# Patient Record
Sex: MALE | Race: WHITE | NOT HISPANIC OR LATINO | Employment: FULL TIME | URBAN - METROPOLITAN AREA
[De-identification: names, ages, dates, MRNs, and addresses within clinical notes are randomized per-mention and may not be internally consistent; named-entity substitution may affect disease eponyms.]

---

## 2017-10-23 ENCOUNTER — APPOINTMENT (OUTPATIENT)
Dept: SOCIAL WORK | Facility: CLINIC | Age: 47
End: 2017-10-23
Payer: COMMERCIAL

## 2017-10-23 PROCEDURE — 90686 IIV4 VACC NO PRSV 0.5 ML IM: CPT | Performed by: REGISTERED NURSE

## 2017-10-23 PROCEDURE — 90471 IMMUNIZATION ADMIN: CPT | Performed by: REGISTERED NURSE

## 2018-03-08 ENCOUNTER — HOSPITAL ENCOUNTER (OUTPATIENT)
Dept: LAB | Facility: MEDICAL CENTER | Age: 48
End: 2018-03-08
Attending: FAMILY MEDICINE
Payer: COMMERCIAL

## 2018-03-08 LAB — TSH SERPL DL<=0.005 MIU/L-ACNC: 1.59 UIU/ML (ref 0.38–5.33)

## 2018-03-08 PROCEDURE — 84443 ASSAY THYROID STIM HORMONE: CPT

## 2018-03-08 PROCEDURE — 84403 ASSAY OF TOTAL TESTOSTERONE: CPT

## 2018-03-08 PROCEDURE — 84270 ASSAY OF SEX HORMONE GLOBUL: CPT

## 2018-03-08 PROCEDURE — 36415 COLL VENOUS BLD VENIPUNCTURE: CPT

## 2018-03-08 PROCEDURE — 84402 ASSAY OF FREE TESTOSTERONE: CPT

## 2018-03-10 LAB
SHBG SERPL-SCNC: 45 NMOL/L (ref 11–80)
TESTOST FREE MFR SERPL: 1.5 % (ref 1.6–2.9)
TESTOST FREE SERPL-MCNC: 32 PG/ML (ref 47–244)
TESTOST SERPL-MCNC: 216 NG/DL (ref 300–890)

## 2018-04-19 ENCOUNTER — HOSPITAL ENCOUNTER (OUTPATIENT)
Dept: LAB | Facility: MEDICAL CENTER | Age: 48
End: 2018-04-19
Attending: FAMILY MEDICINE
Payer: COMMERCIAL

## 2018-04-19 LAB — TSH SERPL DL<=0.005 MIU/L-ACNC: 1.55 UIU/ML (ref 0.38–5.33)

## 2018-04-19 PROCEDURE — 84443 ASSAY THYROID STIM HORMONE: CPT

## 2018-04-19 PROCEDURE — 84403 ASSAY OF TOTAL TESTOSTERONE: CPT

## 2018-04-19 PROCEDURE — 84270 ASSAY OF SEX HORMONE GLOBUL: CPT

## 2018-04-19 PROCEDURE — 36415 COLL VENOUS BLD VENIPUNCTURE: CPT

## 2018-04-21 LAB
SHBG SERPL-SCNC: 42 NMOL/L (ref 11–80)
TESTOST FREE MFR SERPL: 1.5 % (ref 1.6–2.9)
TESTOST FREE SERPL-MCNC: 29 PG/ML (ref 47–244)
TESTOST SERPL-MCNC: 194 NG/DL (ref 300–890)

## 2019-02-25 ENCOUNTER — OFFICE VISIT (OUTPATIENT)
Dept: NEUROLOGY | Facility: MEDICAL CENTER | Age: 49
End: 2019-02-25
Payer: COMMERCIAL

## 2019-02-25 VITALS
OXYGEN SATURATION: 96 % | WEIGHT: 208.6 LBS | HEART RATE: 68 BPM | TEMPERATURE: 98 F | HEIGHT: 73 IN | DIASTOLIC BLOOD PRESSURE: 82 MMHG | SYSTOLIC BLOOD PRESSURE: 138 MMHG | BODY MASS INDEX: 27.65 KG/M2 | RESPIRATION RATE: 17 BRPM

## 2019-02-25 PROCEDURE — 99213 OFFICE O/P EST LOW 20 MIN: CPT | Performed by: NURSE PRACTITIONER

## 2019-02-25 ASSESSMENT — ENCOUNTER SYMPTOMS
DEPRESSION: 0
VOMITING: 0
NAUSEA: 0
DOUBLE VISION: 0
DIARRHEA: 0
HEADACHES: 1
COUGH: 0
SEIZURES: 0
NERVOUS/ANXIOUS: 0
MUSCULOSKELETAL NEGATIVE: 1
ABDOMINAL PAIN: 0
SORE THROAT: 0
CONSTITUTIONAL NEGATIVE: 1

## 2019-02-25 ASSESSMENT — PATIENT HEALTH QUESTIONNAIRE - PHQ9: CLINICAL INTERPRETATION OF PHQ2 SCORE: 0

## 2019-02-25 ASSESSMENT — PAIN SCALES - GENERAL: PAINLEVEL: 2=MINIMAL-SLIGHT

## 2019-02-25 NOTE — PROGRESS NOTES
Subjective:      Anish Walker is a 49 y.o. male who presents with Follow-Up (chronic migraine)          HPI  Presents today for chronic migraines.  His son has been very successful with using a CGRP modulator which has greatly improved his migraines.  He would like to begin the same treatment.    Headaches are consistent and back to their worst since October/November.  He had some reprieve with Botox which was stopped in 2016.    Migraine frequency: headaches occurring 3-4 times per week. Onset is variable.    Abortive treatment: Advil followed by Imitrex then lastly a Treximet if needed.    He is still working shift work as a .    Current Outpatient Prescriptions   Medication Sig Dispense Refill   • TREXIMET  MG Tab TAKE 0.5 TO 1 TABLET BY MOUTH AT ONSET OF HEADACHE. MAY REPEAT DOSE IN 2 HOURS IF UNRELIEVED. 9 Tab 2   • sumatriptan (IMITREX) 100 MG tablet Take 1/2-1 tablet po at onset of headache, may repeat dose in 2 hours if unrelieved.  Do not exceed more than 2 tablets in 24 hours. 9 Tab 5   • atorvastatin (LIPITOR) 20 MG TABS Take 20 mg by mouth every evening.     • Sumatriptan-Naproxen Sodium (TREXIMET PO) Take  by mouth.     • FLUoxetine HCl (PROZAC PO) Take  by mouth.       No current facility-administered medications for this visit.          Review of Systems   Constitutional: Negative.    HENT: Negative for hearing loss, nosebleeds and sore throat.         No recent head injury.   Eyes: Negative for double vision.        No new loss of vision.   Respiratory: Negative for cough.         No recent lung infections.   Cardiovascular: Negative for chest pain.   Gastrointestinal: Negative for abdominal pain, diarrhea, nausea and vomiting.   Genitourinary: Negative.    Musculoskeletal: Negative.    Skin: Negative.    Neurological: Positive for headaches. Negative for seizures.   Endo/Heme/Allergies:        No history of endocrine dysfunction.  No new problems.   Psychiatric/Behavioral:  "Negative for depression. The patient is not nervous/anxious.         No recent mood changes.          Objective:     /82 (BP Location: Left arm, Patient Position: Sitting)   Pulse 68   Temp 36.7 °C (98 °F)   Resp 17   Ht 1.854 m (6' 1\")   Wt 94.6 kg (208 lb 9.6 oz)   SpO2 96%   BMI 27.52 kg/m²      Physical Exam   Constitutional: He is oriented to person, place, and time. He appears well-developed and well-nourished. No distress.   HENT:   Head: Normocephalic and atraumatic.   Nose: Nose normal.   No new hearing loss.   Eyes: EOM are normal.   No double vision or loss of vision.   Neck: Normal range of motion.   Cardiovascular: Normal rate and regular rhythm.    No recent chest pain.   Pulmonary/Chest: Effort normal.   Abdominal: There is no tenderness.   Genitourinary:   Genitourinary Comments: No recent infections.   Musculoskeletal: Normal range of motion.   Lymphadenopathy:     He has no cervical adenopathy.   Neurological: He is alert and oriented to person, place, and time. He has normal strength and normal reflexes. He displays no tremor. No cranial nerve deficit. He displays no seizure activity. Gait normal.   No observable changes in neurologic status.  See initial new patient examination for details.     Skin: Skin is warm and dry.   Psychiatric: He has a normal mood and affect. His speech is normal. His mood appears not anxious. He does not exhibit a depressed mood.             Assessment/Plan:     Chronic migraine:  Significant improvement with Botox injection sets.  Migraines have returned and are occurring approximately 3-4 times weekly.     Counseled regarding migraine prophylaxis options including the CGRP modulator.  Will new start Ajovy monthly injections.  Discussed usage, side effect profile, and benefits of the CGRP modulator.    He will continue to use imitrex tablets and Treximet tablets for abortive treatment per PCP.    Return for follow-up in 6 months for evaluation of " effectiveness of Ajovy.  To call with any concerns or lack of efficacy.

## 2019-03-05 ENCOUNTER — OFFICE VISIT (OUTPATIENT)
Dept: URGENT CARE | Facility: CLINIC | Age: 49
End: 2019-03-05
Payer: COMMERCIAL

## 2019-03-05 VITALS
HEIGHT: 73 IN | WEIGHT: 205 LBS | HEART RATE: 85 BPM | BODY MASS INDEX: 27.17 KG/M2 | OXYGEN SATURATION: 94 % | SYSTOLIC BLOOD PRESSURE: 140 MMHG | RESPIRATION RATE: 14 BRPM | TEMPERATURE: 98.1 F | DIASTOLIC BLOOD PRESSURE: 90 MMHG
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
## 2019-03-05 PROCEDURE — 99203 OFFICE O/P NEW LOW 30 MIN: CPT | Performed by: NURSE PRACTITIONER

## 2019-03-05 RX ORDER — FLUOXETINE HYDROCHLORIDE 60 MG/1
1 TABLET, FILM COATED ORAL; ORAL EVERY EVENING
Refills: 6 | COMMUNITY
Start: 2018-12-21

## 2019-03-05 RX ORDER — CEPHALEXIN 500 MG/1
500 CAPSULE ORAL 3 TIMES DAILY
Qty: 30 CAP | Refills: 0 | Status: SHIPPED | OUTPATIENT
Start: 2019-03-05 | End: 2019-03-15

## 2019-03-05 ASSESSMENT — ENCOUNTER SYMPTOMS
PAIN: 1
CHILLS: 0
NUMBNESS: 0
DIZZINESS: 0
EYE PAIN: 0
SORE THROAT: 0
FEVER: 0
MYALGIAS: 0
ABDOMINAL PAIN: 0
VOMITING: 0
DIAPHORESIS: 0
WEAKNESS: 0
JOINT SWELLING: 1
FATIGUE: 0
NAUSEA: 0
HEADACHES: 0
SHORTNESS OF BREATH: 0

## 2019-03-06 NOTE — PROGRESS NOTES
Subjective:     Anish Walker is a 49 y.o. male who presents for Insect Bite (x3days, left wrist insect bite, swollen, painful, initaially itchiness,  arm tingling)  Patient is a 49-year-old male presents clinic today for evaluation of an infected insect bite that occurred 3 days ago.  Left wrist has become painful, swollen, redness with itching.  He has attempted to take a Benadryl however made him sleepy and had no effect.  Denies any fever, chills.  He does have some tingling in the left arm.  Patient verbalizing multiple others scabbed lesions on the left arm are unrelated from a trip to [place].  Verbalizing that those lesions are vastly improved.     Pain   This is a new problem. Episode onset: 3 days. The problem occurs constantly. The problem has been gradually worsening. Associated symptoms include joint swelling (left wrist infected insect bite, red swollen). Pertinent negatives include no abdominal pain, chest pain, chills, diaphoresis, fatigue, fever, headaches, myalgias, nausea, numbness, rash, sore throat, vomiting or weakness. Nothing aggravates the symptoms. Treatments tried: benadryl. The treatment provided no relief.     Past Medical History:   Diagnosis Date   • Head ache    • Migraine    History reviewed. No pertinent surgical history.  Social History     Social History   • Marital status:      Spouse name: N/A   • Number of children: N/A   • Years of education: N/A     Occupational History   • Not on file.     Social History Main Topics   • Smoking status: Never Smoker   • Smokeless tobacco: Never Used   • Alcohol use Yes      Comment: a few during week   • Drug use: No   • Sexual activity: Not on file     Other Topics Concern   • Not on file     Social History Narrative   • No narrative on file    History reviewed. No pertinent family history. Review of Systems   Constitutional: Negative for chills, diaphoresis, fatigue and fever.   HENT: Negative for sore throat.    Eyes:  "Negative for pain.   Respiratory: Negative for shortness of breath.    Cardiovascular: Negative for chest pain.   Gastrointestinal: Negative for abdominal pain, nausea and vomiting.   Genitourinary: Negative for hematuria.   Musculoskeletal: Positive for joint swelling (left wrist infected insect bite, red swollen). Negative for myalgias.   Skin: Negative for rash.        Insect bite of the left wrist red, painful, swollen   Neurological: Negative for dizziness, weakness, numbness and headaches.     Allergies   Allergen Reactions   • Biaxin [Clarithromycin]       Objective:   /90 (BP Location: Right arm, Patient Position: Sitting, BP Cuff Size: Adult)   Pulse 85   Temp 36.7 °C (98.1 °F) (Temporal)   Resp 14   Ht 1.854 m (6' 1\")   Wt 93 kg (205 lb)   SpO2 94%   BMI 27.05 kg/m²   Physical Exam   Constitutional: He is oriented to person, place, and time. He appears well-developed and well-nourished. No distress.   HENT:   Head: Normocephalic and atraumatic.   Eyes: Pupils are equal, round, and reactive to light. Conjunctivae and EOM are normal.   Cardiovascular: Normal rate and regular rhythm.    No murmur heard.  Pulmonary/Chest: Effort normal and breath sounds normal. No respiratory distress.   Abdominal: Soft. He exhibits no distension. There is no tenderness.   Neurological: He is alert and oriented to person, place, and time. He has normal reflexes. No sensory deficit.   Skin: Skin is warm and dry. Lesion and rash noted. There is erythema.        Psychiatric: He has a normal mood and affect.         Assessment/Plan:   Assessment    1. Bug bite with infection, initial encounter  cephALEXin (KEFLEX) 500 MG Cap     Encourage patient to take daily antihistamine in combination to prescribed antibiotics.  Advised if symptoms not improving and/or worsen he needs to return to clinic immediately for reevaluation and antibiotic change.  Patient given precautionary s/sx that mandate immediate follow up and " evaluation in the ED. Advised of risks of not doing so.    DDX, Supportive care, and indications for immediate follow-up discussed with patient.    Instructed to return to clinic or nearest emergency department if we are not available for any change in condition, further concerns, or worsening of symptoms.    The patient demonstrated a good understanding and agreed with the treatment plan.

## 2019-03-24 ENCOUNTER — HOSPITAL ENCOUNTER (EMERGENCY)
Facility: MEDICAL CENTER | Age: 49
End: 2019-03-24
Attending: EMERGENCY MEDICINE
Payer: COMMERCIAL

## 2019-03-24 ENCOUNTER — APPOINTMENT (OUTPATIENT)
Dept: RADIOLOGY | Facility: MEDICAL CENTER | Age: 49
End: 2019-03-24
Attending: EMERGENCY MEDICINE
Payer: COMMERCIAL

## 2019-03-24 VITALS
HEIGHT: 73 IN | BODY MASS INDEX: 27.17 KG/M2 | WEIGHT: 205.03 LBS | HEART RATE: 64 BPM | SYSTOLIC BLOOD PRESSURE: 146 MMHG | DIASTOLIC BLOOD PRESSURE: 94 MMHG | OXYGEN SATURATION: 94 % | TEMPERATURE: 98.7 F | RESPIRATION RATE: 18 BRPM

## 2019-03-24 DIAGNOSIS — L03.211 FACIAL CELLULITIS: ICD-10-CM

## 2019-03-24 LAB
ALBUMIN SERPL BCP-MCNC: 4.3 G/DL (ref 3.2–4.9)
ALBUMIN/GLOB SERPL: 1.7 G/DL
ALP SERPL-CCNC: 71 U/L (ref 30–99)
ALT SERPL-CCNC: 25 U/L (ref 2–50)
ANION GAP SERPL CALC-SCNC: 9 MMOL/L (ref 0–11.9)
AST SERPL-CCNC: 29 U/L (ref 12–45)
BASOPHILS # BLD AUTO: 0.4 % (ref 0–1.8)
BASOPHILS # BLD: 0.05 K/UL (ref 0–0.12)
BILIRUB SERPL-MCNC: 1.4 MG/DL (ref 0.1–1.5)
BUN SERPL-MCNC: 18 MG/DL (ref 8–22)
CALCIUM SERPL-MCNC: 9.4 MG/DL (ref 8.4–10.2)
CHLORIDE SERPL-SCNC: 105 MMOL/L (ref 96–112)
CO2 SERPL-SCNC: 24 MMOL/L (ref 20–33)
CREAT SERPL-MCNC: 0.81 MG/DL (ref 0.5–1.4)
EOSINOPHIL # BLD AUTO: 0.11 K/UL (ref 0–0.51)
EOSINOPHIL NFR BLD: 0.9 % (ref 0–6.9)
ERYTHROCYTE [DISTWIDTH] IN BLOOD BY AUTOMATED COUNT: 38.1 FL (ref 35.9–50)
GLOBULIN SER CALC-MCNC: 2.5 G/DL (ref 1.9–3.5)
GLUCOSE SERPL-MCNC: 106 MG/DL (ref 65–99)
HCT VFR BLD AUTO: 42.3 % (ref 42–52)
HGB BLD-MCNC: 14.5 G/DL (ref 14–18)
IMM GRANULOCYTES # BLD AUTO: 0.04 K/UL (ref 0–0.11)
IMM GRANULOCYTES NFR BLD AUTO: 0.3 % (ref 0–0.9)
LYMPHOCYTES # BLD AUTO: 0.81 K/UL (ref 1–4.8)
LYMPHOCYTES NFR BLD: 6.6 % (ref 22–41)
MCH RBC QN AUTO: 30.5 PG (ref 27–33)
MCHC RBC AUTO-ENTMCNC: 34.3 G/DL (ref 33.7–35.3)
MCV RBC AUTO: 88.9 FL (ref 81.4–97.8)
MONOCYTES # BLD AUTO: 0.87 K/UL (ref 0–0.85)
MONOCYTES NFR BLD AUTO: 7.1 % (ref 0–13.4)
NEUTROPHILS # BLD AUTO: 10.37 K/UL (ref 1.82–7.42)
NEUTROPHILS NFR BLD: 84.7 % (ref 44–72)
NRBC # BLD AUTO: 0 K/UL
NRBC BLD-RTO: 0 /100 WBC
PLATELET # BLD AUTO: 217 K/UL (ref 164–446)
PMV BLD AUTO: 9.6 FL (ref 9–12.9)
POTASSIUM SERPL-SCNC: 4 MMOL/L (ref 3.6–5.5)
PROT SERPL-MCNC: 6.8 G/DL (ref 6–8.2)
RBC # BLD AUTO: 4.76 M/UL (ref 4.7–6.1)
SODIUM SERPL-SCNC: 138 MMOL/L (ref 135–145)
WBC # BLD AUTO: 12.3 K/UL (ref 4.8–10.8)

## 2019-03-24 PROCEDURE — 96365 THER/PROPH/DIAG IV INF INIT: CPT

## 2019-03-24 PROCEDURE — 87040 BLOOD CULTURE FOR BACTERIA: CPT

## 2019-03-24 PROCEDURE — 80053 COMPREHEN METABOLIC PANEL: CPT

## 2019-03-24 PROCEDURE — 85025 COMPLETE CBC W/AUTO DIFF WBC: CPT

## 2019-03-24 PROCEDURE — 700111 HCHG RX REV CODE 636 W/ 250 OVERRIDE (IP): Performed by: EMERGENCY MEDICINE

## 2019-03-24 PROCEDURE — 700105 HCHG RX REV CODE 258: Performed by: EMERGENCY MEDICINE

## 2019-03-24 PROCEDURE — 70491 CT SOFT TISSUE NECK W/DYE: CPT

## 2019-03-24 PROCEDURE — 99284 EMERGENCY DEPT VISIT MOD MDM: CPT

## 2019-03-24 PROCEDURE — 700117 HCHG RX CONTRAST REV CODE 255: Performed by: EMERGENCY MEDICINE

## 2019-03-24 RX ORDER — CEPHALEXIN 500 MG/1
500 CAPSULE ORAL 3 TIMES DAILY
Status: SHIPPED | COMMUNITY
Start: 2019-03-05 | End: 2019-09-30

## 2019-03-24 RX ORDER — IBUPROFEN 200 MG
800 TABLET ORAL EVERY 6 HOURS PRN
Status: SHIPPED | COMMUNITY
End: 2023-11-09

## 2019-03-24 RX ORDER — AMOXICILLIN AND CLAVULANATE POTASSIUM 875; 125 MG/1; MG/1
1 TABLET, FILM COATED ORAL 2 TIMES DAILY
Qty: 14 TAB | Refills: 0 | Status: SHIPPED | OUTPATIENT
Start: 2019-03-24 | End: 2019-03-31

## 2019-03-24 RX ORDER — ONDANSETRON 4 MG/1
4 TABLET, ORALLY DISINTEGRATING ORAL EVERY 8 HOURS PRN
Qty: 10 TAB | Refills: 0 | Status: SHIPPED | OUTPATIENT
Start: 2019-03-24 | End: 2023-11-09

## 2019-03-24 RX ORDER — SULFAMETHOXAZOLE AND TRIMETHOPRIM 800; 160 MG/1; MG/1
1 TABLET ORAL 2 TIMES DAILY
Qty: 14 TAB | Refills: 0 | Status: SHIPPED | OUTPATIENT
Start: 2019-03-24 | End: 2019-03-31

## 2019-03-24 RX ADMIN — SODIUM CHLORIDE 3 G: 900 INJECTION INTRAVENOUS at 09:14

## 2019-03-24 RX ADMIN — IOHEXOL 80 ML: 350 INJECTION, SOLUTION INTRAVENOUS at 09:56

## 2019-03-24 NOTE — ED NOTES
Med rec updated and complete  Allergies reviewed  Interviewed pt with wife at bedside with permission from pt.

## 2019-03-24 NOTE — ED TRIAGE NOTES
"C/O left lower jaw abscess since yesterday after a routine dental cleaning.  A small lesion is noted on the left face where the swelling is located. He admit \"picking at it.\"   Chief Complaint   Patient presents with   • Abscess   • Facial Swelling       /94   Pulse 70   Temp 36.8 °C (98.3 °F) (Temporal)   Resp 18   Ht 1.854 m (6' 1\")   Wt 93 kg (205 lb 0.4 oz)   SpO2 96%   BMI 27.05 kg/m²     "

## 2019-03-24 NOTE — ED PROVIDER NOTES
"ED Provider Note    CHIEF COMPLAINT  Chief Complaint   Patient presents with   • Abscess   • Facial Swelling       [facility]  Anish Walker is a 49 y.o. male who presents for evaluation of facial swelling.  3 days ago the patient had dental cleaning performed.  He started noticing swelling to his left jaw region yesterday which is gotten progressively worse.  He has had no fevers.  He has no difficulty breathing or swallowing.  5 days ago he completed a course of Keflex for treatment of an abscess to his left wrist which had been drained.  That seems to be doing fine at this time.  He denies any dental pain.  The patient's wife is an [employer] that works with Dr. Abreu.  Dr. Abreu gave me a call regarding this patient prior to his arrival.    REVIEW OF SYSTEMS  See HPI for further details. All other systems negative.    PAST MEDICAL HISTORY  Past Medical History:   Diagnosis Date   • Head ache    • Migraine        FAMILY HISTORY  History reviewed. No pertinent family history.    SOCIAL HISTORY  Social History     Social History   • Marital status:      Spouse name: N/A   • Number of children: N/A   • Years of education: N/A     Social History Main Topics   • Smoking status: Never Smoker   • Smokeless tobacco: Never Used   • Alcohol use Yes      Comment: Occasionally   • Drug use: No   • Sexual activity: Not on file     Other Topics Concern   • Not on file     Social History Narrative   • No narrative on file       SURGICAL HISTORY  History reviewed. No pertinent surgical history.    CURRENT MEDICATIONS  Home Medications    **Home medications have not yet been reviewed for this encounter**         ALLERGIES  Allergies   Allergen Reactions   • Biaxin [Clarithromycin]        PHYSICAL EXAM  VITAL SIGNS: /94   Pulse 70   Temp 36.8 °C (98.3 °F) (Temporal)   Resp 18   Ht 1.854 m (6' 1\")   Wt 93 kg (205 lb 0.4 oz)   SpO2 96%   BMI 27.05 kg/m²   Constitutional: Well developed, Well nourished, No acute distress, " Non-toxic appearance.   HENT: Normocephalic, slight left facial swelling.  No trismus.  Oral exam shows no focal gum swelling.  Floor of the mouth appears soft.  Small open wound overlying the swollen area where the patient states he has been picking at it.  Eyes:  EOMI, Conjunctiva normal, No discharge.   Cardiovascular: Normal heart rate, Normal rhythm, No murmurs, No rubs, No gallops.   Thorax & Lungs: Clear to auscultation without wheezes, rales, or rhonchi.    Skin: Warm, Dry.  Musculoskeletal: Good range of motion in all major joints.  Neurologic: Awake and alert.  No focal deficits noted.    RADIOLOGY/PROCEDURES  CT-SOFT TISSUE NECK WITH   Final Result      Inflammatory changes along the left mandible extending into the submandibular region. No abscess collection is identified.      Mildly prominent submandibular and submental lymph nodes are likely reactive.      Mild mucosal thickening in the right maxillary sinus.      Rightward deviation of the nasal septum.               COURSE & MEDICAL DECISION MAKING  Pertinent Labs & Imaging studies reviewed. (See chart for details)  This is a 49-year-old here for evaluation of left facial swelling.  Patient appears to have an infection and possibly abscess therefore an IV is established.  Laboratories are obtained to include a CBC with a white count of 12 and a differential of 84 polys and 6 lymphocytes.  Chemistries are all essentially normal.  Blood cultures are obtained.  Patient is treated with Unasyn 3 g IV.  CT scan of the soft tissues of the neck demonstrate inflammatory changes but no evidence of abscess.  He does have some reactive lymph nodes noted.  I discussed the results of the study with the patient and his wife.  At this point this appears to represent a facial cellulitis.  I explained that there is no abscess requiring drainage.  I will start him on Augmentin and Bactrim.  He is also provided a prescription for Zofran.  He is to be reevaluated by his  primary care provider.  His wife is also an [employer] and she will be able to keep tabs on it.  He should return here for any worsening symptoms.  He is given a discharge instruction sheet on facial infections.    FINAL IMPRESSION  1.  Facial cellulitis  2.   3.         Electronically signed by: Otto Portillo, 3/24/2019 8:51 AM

## 2019-03-24 NOTE — ED NOTES
Patient and wife understand discharge instructions. Will start antibiotics today. Will return as needed.

## 2019-03-24 NOTE — ED NOTES
Had teeth cleaned on Thursday. And swelling has been bad for a day. Finished antibiotics last week for wound on his left wrist. Took keflex

## 2019-03-27 ENCOUNTER — HOSPITAL ENCOUNTER (OUTPATIENT)
Dept: LAB | Facility: MEDICAL CENTER | Age: 49
End: 2019-03-27
Attending: OTOLARYNGOLOGY
Payer: COMMERCIAL

## 2019-03-27 PROCEDURE — 87077 CULTURE AEROBIC IDENTIFY: CPT

## 2019-03-27 PROCEDURE — 87205 SMEAR GRAM STAIN: CPT

## 2019-03-27 PROCEDURE — 87186 SC STD MICRODIL/AGAR DIL: CPT

## 2019-03-27 PROCEDURE — 87070 CULTURE OTHR SPECIMN AEROBIC: CPT

## 2019-03-28 LAB
GRAM STN SPEC: NORMAL
SIGNIFICANT IND 70042: NORMAL
SITE SITE: NORMAL
SOURCE SOURCE: NORMAL

## 2019-03-29 LAB
BACTERIA BLD CULT: NORMAL
BACTERIA BLD CULT: NORMAL
SIGNIFICANT IND 70042: NORMAL
SIGNIFICANT IND 70042: NORMAL
SITE SITE: NORMAL
SITE SITE: NORMAL
SOURCE SOURCE: NORMAL
SOURCE SOURCE: NORMAL

## 2019-03-30 LAB
BACTERIA WND AEROBE CULT: ABNORMAL
BACTERIA WND AEROBE CULT: ABNORMAL
GRAM STN SPEC: ABNORMAL
SIGNIFICANT IND 70042: ABNORMAL
SITE SITE: ABNORMAL
SOURCE SOURCE: ABNORMAL

## 2019-09-30 ENCOUNTER — OFFICE VISIT (OUTPATIENT)
Dept: NEUROLOGY | Facility: MEDICAL CENTER | Age: 49
End: 2019-09-30
Payer: COMMERCIAL

## 2019-09-30 ENCOUNTER — TELEPHONE (OUTPATIENT)
Dept: NEUROLOGY | Facility: MEDICAL CENTER | Age: 49
End: 2019-09-30

## 2019-09-30 VITALS
HEIGHT: 73 IN | HEART RATE: 72 BPM | RESPIRATION RATE: 16 BRPM | SYSTOLIC BLOOD PRESSURE: 118 MMHG | OXYGEN SATURATION: 95 % | WEIGHT: 200 LBS | DIASTOLIC BLOOD PRESSURE: 80 MMHG | TEMPERATURE: 97 F | BODY MASS INDEX: 26.51 KG/M2

## 2019-09-30 PROCEDURE — 99213 OFFICE O/P EST LOW 20 MIN: CPT | Performed by: NURSE PRACTITIONER

## 2019-09-30 RX ORDER — SUMATRIPTAN AND NAPROXEN SODIUM 85; 500 MG/1; MG/1
TABLET, FILM COATED ORAL
Refills: 6 | COMMUNITY
Start: 2019-09-20 | End: 2019-09-30 | Stop reason: SDUPTHER

## 2019-09-30 RX ORDER — SUMATRIPTAN AND NAPROXEN SODIUM 85; 500 MG/1; MG/1
TABLET, FILM COATED ORAL
Qty: 9 TAB | Refills: 5 | Status: SHIPPED | OUTPATIENT
Start: 2019-09-30

## 2019-09-30 NOTE — PROGRESS NOTES
Subjective:      Anish Walker is a 49 y.o. male who presents with Follow-Up (Chronic migraines)            HPI Doing well with using the Ajovy for migraine treatment.  Began Ajovy in October 2018.      Migraine frequency: headaches occurring 3-4 times per week with onset variable prior to the start of Ajovy.  Now rarely with a headache and if he does have one, the triggers are quite apparent.     If he is dehyrated or sleep deprived he will have a dull ache.      Treximet is effective for abortive treatment when he has a migraine.    Cleared with his annual work physical beginning of 2019.    Current Outpatient Medications   Medication Sig Dispense Refill   • AJOVY 225 MG/1.5ML Solution Prefilled Syringe INJECT ONCE MONTHLY 1.5 mL 5   • Ascorbic Acid (VITAMIN C PO) Take 1 Tab by mouth every evening.     • VITAMIN E PO Take 1 Cap by mouth every evening.     • aspirin EC (ECOTRIN) 81 MG Tablet Delayed Response Take 81 mg by mouth every evening.     • ibuprofen (MOTRIN) 200 MG Tab Take 800 mg by mouth every 6 hours as needed for Mild Pain.     • ondansetron (ZOFRAN ODT) 4 MG TABLET DISPERSIBLE Take 1 Tab by mouth every 8 hours as needed. 10 Tab 0   • FLUoxetine HCl 60 MG Tab Take 1 Tab by mouth every evening.  6   • atorvastatin (LIPITOR) 20 MG TABS Take 20 mg by mouth every evening.     • SUMAtriptan-Naproxen Sodium  MG Tab Take 1/2-1 tablet po at onset of headache, may repeat dose in 2 hours if unrelieved.  Do not exceed more than 2 tablets in 24 hours. 9 Tab 5     No current facility-administered medications for this visit.          Review of Systems   Constitutional: Negative.    HENT: Negative for hearing loss, nosebleeds and sore throat.         No recent head injury.   Eyes: Negative for double vision.        No new loss of vision.   Respiratory: Negative for cough.         No recent lung infections.   Cardiovascular: Negative for chest pain.   Gastrointestinal: Negative for abdominal pain, diarrhea,  nausea and vomiting.   Genitourinary: Negative.    Musculoskeletal: Negative.    Skin: Negative.    Neurological: Positive for headaches. Negative for seizures.   Endo/Heme/Allergies:        No history of endocrine dysfunction.  No new problems.   Psychiatric/Behavioral: Negative for depression. The patient is not nervous/anxious.         No recent mood changes.          Objective:     Wt 90.7 kg (200 lb)   BMI 26.39 kg/m²      Physical Exam   Constitutional: He is oriented to person, place, and time. He appears well-developed and well-nourished. No distress.   HENT:   Head: Normocephalic and atraumatic.   Nose: Nose normal.   No new hearing loss.   Eyes: EOM are normal.   No double vision or loss of vision.   Neck: Normal range of motion.   Cardiovascular: Normal rate and regular rhythm.   No recent chest pain.   Pulmonary/Chest: Effort normal.   Abdominal: There is no tenderness.   Genitourinary:   Genitourinary Comments: No recent infections.   Musculoskeletal: Normal range of motion.   Lymphadenopathy:     He has no cervical adenopathy.   Neurological: He is alert and oriented to person, place, and time. He has normal strength and normal reflexes. He displays no tremor. No cranial nerve deficit. He displays no seizure activity. Gait normal.   No observable changes in neurologic status.  See initial new patient examination for details.     Skin: Skin is warm and dry.   Psychiatric: He has a normal mood and affect. His speech is normal. His mood appears not anxious. He does not exhibit a depressed mood.             Assessment/Plan:   Chronic migraine:  Significant improvement with Botox injection sets.  Migraines returned and baseline approximately 3-4 times weekly.      Continue Ajovy, CGRP inhibitor, monthly injections.  Discussed usage, side effect profile, and benefits of the CGRP modulator.     He will continue to use imitrex tablets and Treximet tablets for abortive treatment per PCP.     Return for  follow-up in 6 months for evaluation of effectiveness of Ajovy.

## 2019-10-16 ASSESSMENT — ENCOUNTER SYMPTOMS
NERVOUS/ANXIOUS: 0
DOUBLE VISION: 0
VOMITING: 0
DEPRESSION: 0
ABDOMINAL PAIN: 0
HEADACHES: 1
CONSTITUTIONAL NEGATIVE: 1
NAUSEA: 0
SORE THROAT: 0
MUSCULOSKELETAL NEGATIVE: 1
DIARRHEA: 0
SEIZURES: 0
COUGH: 0

## 2019-11-08 ENCOUNTER — IMMUNIZATION (OUTPATIENT)
Dept: SOCIAL WORK | Facility: CLINIC | Age: 49
End: 2019-11-08
Payer: COMMERCIAL

## 2019-11-08 DIAGNOSIS — Z23 NEED FOR VACCINATION: ICD-10-CM

## 2019-11-08 PROCEDURE — 90471 IMMUNIZATION ADMIN: CPT | Performed by: REGISTERED NURSE

## 2019-11-08 PROCEDURE — 90686 IIV4 VACC NO PRSV 0.5 ML IM: CPT | Performed by: REGISTERED NURSE

## 2020-04-08 ENCOUNTER — TELEPHONE (OUTPATIENT)
Dept: NEUROLOGY | Facility: MEDICAL CENTER | Age: 50
End: 2020-04-08

## 2021-09-29 ENCOUNTER — HOSPITAL ENCOUNTER (OUTPATIENT)
Facility: MEDICAL CENTER | Age: 51
End: 2021-09-29
Attending: SURGERY
Payer: COMMERCIAL

## 2021-09-29 LAB
COVID ORDER STATUS COVID19: NORMAL
SARS-COV-2 RNA RESP QL NAA+PROBE: NOTDETECTED
SPECIMEN SOURCE: NORMAL

## 2021-09-29 PROCEDURE — U0005 INFEC AGEN DETEC AMPLI PROBE: HCPCS

## 2021-09-29 PROCEDURE — U0003 INFECTIOUS AGENT DETECTION BY NUCLEIC ACID (DNA OR RNA); SEVERE ACUTE RESPIRATORY SYNDROME CORONAVIRUS 2 (SARS-COV-2) (CORONAVIRUS DISEASE [COVID-19]), AMPLIFIED PROBE TECHNIQUE, MAKING USE OF HIGH THROUGHPUT TECHNOLOGIES AS DESCRIBED BY CMS-2020-01-R: HCPCS

## 2023-11-09 ENCOUNTER — APPOINTMENT (OUTPATIENT)
Dept: URGENT CARE | Facility: CLINIC | Age: 53
End: 2023-11-09

## 2023-11-09 ENCOUNTER — OCCUPATIONAL MEDICINE (OUTPATIENT)
Dept: URGENT CARE | Facility: CLINIC | Age: 53
End: 2023-11-09
Payer: COMMERCIAL

## 2023-11-09 VITALS
HEART RATE: 60 BPM | RESPIRATION RATE: 12 BRPM | HEIGHT: 73 IN | SYSTOLIC BLOOD PRESSURE: 130 MMHG | TEMPERATURE: 97.9 F | DIASTOLIC BLOOD PRESSURE: 78 MMHG | WEIGHT: 195 LBS | BODY MASS INDEX: 25.84 KG/M2 | OXYGEN SATURATION: 98 %

## 2023-11-09 DIAGNOSIS — S39.011A STRAIN OF ABDOMINAL MUSCLE, INITIAL ENCOUNTER: ICD-10-CM

## 2023-11-09 DIAGNOSIS — K45.8 OTHER SPECIFIED ABDOMINAL HERNIA WITHOUT OBSTRUCTION OR GANGRENE: ICD-10-CM

## 2023-11-09 PROCEDURE — 3075F SYST BP GE 130 - 139MM HG: CPT | Performed by: FAMILY MEDICINE

## 2023-11-09 PROCEDURE — 99203 OFFICE O/P NEW LOW 30 MIN: CPT | Performed by: FAMILY MEDICINE

## 2023-11-09 PROCEDURE — 3078F DIAST BP <80 MM HG: CPT | Performed by: FAMILY MEDICINE

## 2023-11-09 NOTE — LETTER
[facility]  [address] [address]  Phone:  [phone] - Fax:  [phone]   [facility] Progress Report and Disability Certification  Date of Service: 11/9/2023   No Show:  No  Date / Time of Next Visit: 11/21/23  3:00PM   Claim Information   Patient Name: Anish Walker  Claim Number:     Employer:   [employer] Date of Injury: 10/9/2023     Insurer / TPA: [employer]  ID / SSN:     Occupation:   Diagnosis: Diagnoses of Other specified abdominal hernia without obstruction or gangrene and Strain of abdominal muscle, initial encounter were pertinent to this visit.    Medical Information   Related to Industrial Injury? Yes    Subjective Complaints:  DOI 10/09/2023 SUMAN: lifting something heavy and turning/twisting and felt a pain to Rt groin lower abd area. Since then has had some soreness and small bulge if strains. No nvfc. Voiding normal, stools normal   Objective Findings:     Pre-Existing Condition(s):     Assessment:   Initial Visit    Status: Discharged / Care Transfer  Permanent Disability:No    Plan: ConsultationTransfer Care    Diagnostics: Other (see comment)  Comments:US    Comments:       Disability Information   Status: Released to Restricted Duty    From:  11/9/2023  Through: 11/23/2023 Restrictions are: Temporary   Physical Restrictions   Sitting:    Standing:    Stooping:    Bending:      Squatting:    Walking:    Climbing:    Pushing:      Pulling:    Other:    Reaching Above Shoulder (L):   Reaching Above Shoulder (R):       Reaching Below Shoulder (L):    Reaching Below Shoulder (R):      Not to exceed Weight Limits   Carrying(hrs):   Weight Limit(lb):   Lifting(hrs):   Weight  Limit(lb):     Comments: * Lifting as tolerated *     Repetitive Actions   Hands: i.e. Fine Manipulations from Grasping:     Feet: i.e. Operating Foot Controls:     Driving / Operate Machinery:      Health Care Provider’s Original or Electronic Signature  Alon Temple M.D. Health Care Provider’s Original or Electronic Signature    Tyler Carrington DO MPH     Clinic Name / Location: [address] [address] Clinic Phone Number: Dept: [phone]   Appointment Time: 10:30 Am Visit Start Time: 10:59 AM   Check-In Time:  10:54 Am Visit Discharge Time: 11:09 AM   Original-Treating Physician or Chiropractor    Page 2-Insurer/TPA    Page 3-Employer    Page 4-Employee

## 2023-11-09 NOTE — LETTER
"    EMPLOYEE’S CLAIM FOR COMPENSATION/ REPORT OF INITIAL TREATMENT  FORM C-4  PLEASE TYPE OR PRINT    EMPLOYEE’S CLAIM - PROVIDE ALL INFORMATION REQUESTED   First Name                    JEANNETTE Oconnell Last Name  Aaron Birthdate                    1970                Sex  []M  []F Claim Number (Insurer’s Use Only)     Home Address  [address] [place] [address] Age  53 y.o. Height  1.854 m (6' 1\") Weight  88.5 kg (195 lb) Social Security Number     [facility] Zip  [address] Telephone  [phone] (home) [phone] (work)   Mailing Address  [address] [place] [address] Zip  [address] Primary Language Spoken  English    INSURER   THIRD-PARTY   [employer]   Employee's Occupation (Job Title) When Injury or Occupational Disease Occurred      Employer's Name/Company Name     Telephone      Office Mail Address (Number and Street)       Date of Injury (if applicable) 10/9/2023               Hours Injury (if applicable)            am               pm Date Employer Notified  10/9/2023 Last Day of Work after Injury or Occupational Disease  10/10/2023 Supervisor to Whom Injury     Reported  Kaveh Solis   Address or Location of Accident (if applicable)  Work [1]   What were you doing at the time of accident? (if applicable)  unloading extrication tool    How did this injury or occupational disease occur? (Be specific and answer in detail. Use additional sheet if necessary)  pulling off extrication tool from engine.pulled off and turn and felt pain on RT side of groin   If you believe that you have an occupational disease, when did you first have knowledge of the disability and its relationship to your employment?   Witnesses to the Accident (if applicable)  NA      Nature of Injury or Occupational Disease    Part(s) of Body Injured or Affected        I CERTIFY THAT THE ABOVE IS TRUE " AND CORRECT TO T HE BEST OF MY KNOWLEDGE AND THAT I HAVE PROVIDED THIS INFORMATION IN ORDER TO OBTAIN THE BENEFITS OF [place], OR [place]).  I HEREBY AUTHORIZE ANY PHYSICIAN, CHIROPRACTOR, SURGEON, PRACTITIONER OR ANY OTHER PERSON, ANY HOSPITAL, INCLUDING [facility] OR [facility], ANY  MEDICAL SERVICE ORGANIZATION, ANY INSURANCE COMPANY, OR OTHER INSTITUTION OR ORGANIZATION TO RELEASE TO EACH OTHER, ANY MEDICAL OR OTHER INFORMATION, INCLUDING BENEFITS PAID OR PAYABLE, PERTINENT TO THIS INJURY OR DISEASE, EXCEPT INFORMATION RELATIVE TO DIAGNOSIS, TREATMENT AND/OR COUNSELING FOR AIDS, PSYCHOLOGICAL CONDITIONS, ALCOHOL OR CONTROLLED SUBSTANCES, FOR WHICH I MUST GIVE SPECIFIC AUTHORIZATION.  A PHOTOSTAT OF THIS AUTHORIZATION SHALL BE VALID AS THE ORIGINAL.     Date 11/9/23   [facility] Employee’s Original or  *Electronic Signature   THIS REPORT MUST BE COMPLETED AND MAILED WITHIN 3 WORKING DAYS OF TREATMENT   [facility]    Name of Facility  [facility]   Date 11/9/2023 Diagnosis and Description of Injury or Occupational Disease  (K45.8) Other specified abdominal hernia without obstruction or gangrene  (S39.011A) Strain of abdominal muscle, initial encounter  Diagnoses of Other specified abdominal hernia without obstruction or gangrene and Strain of abdominal muscle, initial encounter were pertinent to this visit. Is there evidence that the injured employee was under the influence of alcohol and/or another controlled substance at the time of accident?  []No  [] Yes (if yes, please explain)   Hour 10:59 AM  No   Treatment: US Abd, work restrictions (lifting as tolerated)     Have you advised the patient to remain off work five days or more?   [] Yes Indicate dates: From   To    []No If no, is the injured employee capable of: [] full duty [] modified duty                                                              No  Yes  If modified duty, specify any limitations / restrictions:  Lifting as tolerated                                                                                                                                                                                                                                                                                                                                                                                                                X-Ray Findings:      From information given by the employee, together with medical evidence, can you directly connect this injury or occupational disease as job incurred?  []Yes   [] No Yes    Is additional medical care by a physician indicated? []Yes [] No  Yes    Do you know of any previous injury or disease contributing to this condition or occupational disease? []Yes [] No (Explain if yes)                          No   Date  11/9/2023 Print Health Care Provider’s Name  Alon Cortez M.D. I certify that the employer’s copy of  this form was delivered to the employer on:   Address  [address] INSURER'S USE ONLY                       [facility] Zip  [address] Provider’s Tax ID Number  [id number]   Telephone  Dept: [phone]    Health Care Provider’s Original or Electronic Signature  e-SignALON CORTEZ M.D. Degree (MD,DO, DC,PA-C,APRN)  MD  Choose (if applicable)      ORIGINAL - TREATING HEALTHCARE PROVIDER PAGE 2 - INSURER/TPA PAGE 3 - EMPLOYER PAGE 4 - EMPLOYEE             Form C-4 (rev.08/23)        BRIEF DESCRIPTION OF RIGHTS AND BENEFITS  (Pursuant to [place] 616[place])    Notice of Injury or Occupational Disease (Incident Report Form C-1): If an injury or occupational disease (OD) arises out of and in the course of employment, you must provide written notice to your employer as soon as practicable, but no later than 7 days after the accident or OD. Your employer  "shall maintain a sufficient supply of the required forms.    Claim for Compensation (Form C-4): If medical treatment is sought, the form C-4 is available at the place of initial treatment. A completed \"Claim for Compensation\" (Form C-4) must be filed within 90 days after an accident or OD. The treating physician or chiropractor must, within 3 working days after treatment, complete and mail to the employer, the employer's insurer and third-party , the Claim for Compensation.    Medical Treatment: If you require medical treatment for your on-the-job injury or OD, you may be required to select a physician or chiropractor from a list provided by your workers’ compensation insurer, if it has contracted with an Organization for Managed Care (MCO) or Preferred Provider Organization (PPO) or providers of health care. If your employer has not entered into a contract with an MCO or PPO, you may select a physician or chiropractor from the Panel of Physicians and Chiropractors. Any medical costs related to your industrial injury or OD will be paid by your insurer.    Temporary Total Disability (TTD): If your doctor has certified that you are unable to work for a period of at least 5 consecutive days, or 5 cumulative days in a 20-day period, or places restrictions on you that your employer does not accommodate, you may be entitled to TTD compensation.    Temporary Partial Disability (TPD): If the wage you receive upon reemployment is less than the compensation for TTD to which you are entitled, the insurer may be required to pay you TPD compensation to make up the difference. TPD can only be paid for a maximum of 24 months.    Permanent Partial Disability (PPD): When your medical condition is stable and there is an indication of a PPD as a result of your injury or OD, within 30 days, your insurer must arrange for an evaluation by a rating physician or chiropractor to determine the degree of your PPD. The amount of " your PPD award depends on the date of injury, the results of the PPD evaluation, your age and wage.    Permanent Total Disability (PTD): If you are medically certified by a treating physician or chiropractor as permanently and totally disabled and have been granted a PTD status by your insurer, you are entitled to receive monthly benefits not to exceed 66 2/3% of your average monthly wage. The amount of your PTD payments is subject to reduction if you previously received a lump-sum PPD award.    Vocational Rehabilitation Services: You may be eligible for vocational rehabilitation services if you are unable to return to the job due to a permanent physical impairment or permanent restrictions as a result of your injury or occupational disease.    Transportation and Per Yamileth Reimbursement: You may be eligible for travel expenses and per yamileth associated with medical treatment.    Reopening: You may be able to reopen your claim if your condition worsens after claim closure.     Appeal Process: If you disagree with a written determination issued by the insurer or the insurer does not respond to your request, you may appeal to the Department of Administration, , by following the instructions contained in your determination letter. You must appeal the determination within 70 days from the date of the determination letter at [address], [address], [place], [place] [address], or [address], [place] [address]. If you disagree with the  decision, you may appeal to the Department of Administration, . You must file your appeal within 30 days from the date of the  decision letter at [address], [address], [place], [place] [address], or [address] [facility] [facility] [address], [place] [address]. If you disagree with a decision of an , you may file a petition for judicial review with the District Court. You must  do so within 30 days of the Appeal Officer’s decision. You may be represented by an  at your own expense or you may contact the [facility] for possible representation.    [place]  for Injured Workers (NAIW): If you disagree with a  decision, you may request that NAIW represent you without charge at an  Hearing. For information regarding denial of benefits, you may contact the [facility] at: [address], [address], [place] [address], [phone], or [address], [address], [place] [address], [phone]    To File a Complaint with the Division: If you wish to file a complaint with the  of the Division of Industrial Relations (DIR),  please contact the Workers’ Compensation Section, [address], [address], [place], [place] [address], telephone [phone], or [address], [address], [place], [place] [address], telephone [phone].    For assistance with Workers’ Compensation Issues: You may contact the [facility] Office for Consumer Health Assistance, [address], [address], [place], [place] [address], Toll Free 1-361.503.5533, Web site: [email or web address]/Programs/BARBRA E-mail: [email or web address]              __________________________________________________________________                                    _________________            Employee Name / Signature                                                                                                                            Date                                                                                                                                                                                                                              D-2 (rev. 10/20)

## 2023-11-09 NOTE — PROGRESS NOTES
"Subjective     Anish Walker is a 53 y.o. male who presents with Hernia (Sx work related (picking up heavy item) x last month)      DOI 10/09/2023 SUMAN: lifting something heavy and turning/twisting and felt a pain to Rt groin lower abd area. Since then has had some soreness and small bulge if strains. No nvfc. Voiding normal, stools normal     HPI    Review of Systems   Genitourinary:         Hernia   All other systems reviewed and are negative.             Objective     /78 (BP Location: Left arm, Patient Position: Sitting, BP Cuff Size: Adult)   Pulse 60   Temp 36.6 °C (97.9 °F) (Temporal)   Resp 12   Ht 1.854 m (6' 1\")   Wt 88.5 kg (195 lb)   SpO2 98%   BMI 25.73 kg/m²      Physical Exam  Vitals and nursing note reviewed.   Constitutional:       General: He is not in acute distress.     Appearance: Normal appearance. He is well-developed.   HENT:      Head: Normocephalic.   Pulmonary:      Effort: Pulmonary effort is normal. No respiratory distress.   Abdominal:      General: Abdomen is flat.      Palpations: Abdomen is soft.      Tenderness: There is no abdominal tenderness. There is no guarding or rebound.          Comments: Slight mild bulge palpated in this area, mild ttp   Neurological:      Mental Status: He is alert.      Motor: No abnormal muscle tone.   Psychiatric:         Mood and Affect: Mood normal.         Behavior: Behavior normal.                             Assessment & Plan     1. Other specified abdominal hernia without obstruction or gangrene  Referral to General Surgery    US-HERNIA ABDOMEN      2. Strain of abdominal muscle, initial encounter  Referral to General Surgery    US-HERNIA ABDOMEN          Muscle strain and possible area of abdominal wall weakening or small developing hernia.     May do full duty but only as tolerated and avoid activity that may aggravate lower abdomen muscle    OTC Motrin as needed    2 week recheck here if have not seen general Surgery. Sooner if " needed    Patient left in stable condition     Pertinent prior lab work and/or imaging studies in Epic have been reviewed by me today on day of this visit and taken into account for my treatment and plan today    Pertinent PMH/PSH and/or chronic conditions and medications if any were reviewed today and taken into account for my treatment and plan today    Pertinent prior office visit notes in Epic have been reviewed by me today on day of this visit.    Please note that this dictation may have been created using voice recognition software, if so I have made every reasonable attempt to correct obvious errors, but I expect that there are errors of grammar and possibly content that I did not discover before finalizing the note.

## 2023-11-21 ENCOUNTER — OCCUPATIONAL MEDICINE (OUTPATIENT)
Dept: URGENT CARE | Facility: CLINIC | Age: 53
End: 2023-11-21
Payer: COMMERCIAL

## 2023-11-21 VITALS
TEMPERATURE: 97.6 F | HEART RATE: 85 BPM | BODY MASS INDEX: 25.84 KG/M2 | OXYGEN SATURATION: 96 % | HEIGHT: 73 IN | WEIGHT: 195 LBS | RESPIRATION RATE: 14 BRPM | DIASTOLIC BLOOD PRESSURE: 82 MMHG | SYSTOLIC BLOOD PRESSURE: 116 MMHG

## 2023-11-21 DIAGNOSIS — K45.8 OTHER SPECIFIED ABDOMINAL HERNIA WITHOUT OBSTRUCTION OR GANGRENE: ICD-10-CM

## 2023-11-21 PROCEDURE — 3079F DIAST BP 80-89 MM HG: CPT | Performed by: PHYSICIAN ASSISTANT

## 2023-11-21 PROCEDURE — 99213 OFFICE O/P EST LOW 20 MIN: CPT | Performed by: PHYSICIAN ASSISTANT

## 2023-11-21 PROCEDURE — 3074F SYST BP LT 130 MM HG: CPT | Performed by: PHYSICIAN ASSISTANT

## 2023-11-21 NOTE — LETTER
[facility]  [address] [address]  Phone:  [phone] - Fax:  [phone]   [facility] Progress Report and Disability Certification  Date of Service: 11/21/2023   No Show:  No  Date / Time of Next Visit:     Claim Information   Patient Name: Anish Walker  Claim Number:     Employer:    Date of Injury: 10/9/2023     Insurer / TPA: [employer]  ID / SSN:     Occupation:   Diagnosis: The encounter diagnosis was Other specified abdominal hernia without obstruction or gangrene.    Medical Information   Related to Industrial Injury? Yes    Subjective Complaints:  DOI 10/09/2023     Initial visit:  SUMAN: lifting something heavy and turning/twisting and felt a pain to Rt groin lower abd area. Since then has had some soreness and small bulge if strains. No nvfc. Voiding normal, stools normal    First follow-up visit:  Symptoms are unchanged since last office visit.  No reinjury since last office visit.  Does feel that he can return to full duty at this time.  Has been established with general surgery, has had his ultrasound which did diagnose a small hernia and is following up with the surgeon tomorrow to get his surgery date scheduled.   Objective Findings: Small bulge present over the right lower abdomen   Pre-Existing Condition(s):     Assessment:   Condition Same    Status: Additional Care Required  Permanent Disability:No    Plan:   Comments:Follow-up with general surgery for ongoing evaluation and management of hernia.  Work status updated    Diagnostics:      Comments:       Disability Information   Status: Released to Full Duty    From:     Through:   Restrictions are: Temporary   Physical Restrictions   Sitting:    Standing:    Stooping:    Bending:      Squatting:    Walking:    Climbing:    Pushing:      Pulling:    Other:    Reaching Above Shoulder (L):   Reaching Above Shoulder (R):       Reaching Below Shoulder  (L):    Reaching Below Shoulder (R):      Not to exceed Weight Limits   Carrying(hrs):   Weight Limit(lb):   Lifting(hrs):   Weight  Limit(lb):     Comments: We will have patient perform full work duty at this time until surgery date with general surgery.  Over-the-counter medications as needed.  Return to urgent care if symptoms worsen    Repetitive Actions   Hands: i.e. Fine Manipulations from Grasping:     Feet: i.e. Operating Foot Controls:     Driving / Operate Machinery:     Health Care Provider’s Original or Electronic Signature  David Muñoz P.A.-C. Health Care Provider’s Original or Electronic Signature    Tyler Carrington DO MPH     Clinic Name / Location: [address] [address] Clinic Phone Number: Dept: [phone]   Appointment Time: 3:00 Pm Visit Start Time: 3:02 PM   Check-In Time:  2:50 Pm Visit Discharge Time:  3:23PM   Original-Treating Physician or Chiropractor    Page 2-Insurer/TPA    Page 3-Employer    Page 4-Employee

## 2023-11-21 NOTE — PROGRESS NOTES
"Subjective:     Anish Walker is a 53 y.o. male who presents for Follow-Up (W/C F/V INJURY REMAINING THE SAME )      DOI 10/09/2023     Initial visit:  SUMAN: lifting something heavy and turning/twisting and felt a pain to Rt groin lower abd area. Since then has had some soreness and small bulge if strains. No nvfc. Voiding normal, stools normal    First follow-up visit:  Symptoms are unchanged since last office visit.  No reinjury since last office visit.  Does feel that he can return to full duty at this time.  Has been established with general surgery, has had his ultrasound which did diagnose a small hernia and is following up with the surgeon tomorrow to get his surgery date scheduled.    PMH:   No pertinent past medical history to this problem  MEDS:  Medications were reviewed in EMR  ALLERGIES:  Allergies were reviewed in EMR  SOCHX:  Works as a   FH:   No pertinent family history to this problem       Objective:     /82   Pulse 85   Temp 36.4 °C (97.6 °F) (Temporal)   Resp 14   Ht 1.854 m (6' 1\")   Wt 88.5 kg (195 lb)   SpO2 96%   BMI 25.73 kg/m²     Small bulge present over the right lower abdomen    Assessment/Plan:     Encounter Diagnoses   Name Primary?    Other specified abdominal hernia without obstruction or gangrene          Plan:  We will have patient perform full work duty at this time until surgery date with general surgery. Return to urgent care or follow-up with general surgery if symptoms worsen     Differential diagnosis, natural history, supportive care, and indications for immediate follow-up discussed.    David Wanda STACY    "